# Patient Record
Sex: FEMALE | Race: WHITE
[De-identification: names, ages, dates, MRNs, and addresses within clinical notes are randomized per-mention and may not be internally consistent; named-entity substitution may affect disease eponyms.]

---

## 2018-08-09 ENCOUNTER — HOSPITAL ENCOUNTER (EMERGENCY)
Dept: HOSPITAL 74 - JER | Age: 28
Discharge: HOME | End: 2018-08-09
Payer: COMMERCIAL

## 2018-08-09 VITALS — SYSTOLIC BLOOD PRESSURE: 109 MMHG | HEART RATE: 89 BPM | TEMPERATURE: 98.1 F | DIASTOLIC BLOOD PRESSURE: 69 MMHG

## 2018-08-09 VITALS — BODY MASS INDEX: 27.8 KG/M2

## 2018-08-09 DIAGNOSIS — M54.40: ICD-10-CM

## 2018-08-09 DIAGNOSIS — O26.892: Primary | ICD-10-CM

## 2018-08-09 DIAGNOSIS — Z3A.26: ICD-10-CM

## 2018-08-09 LAB
ALBUMIN SERPL-MCNC: 2.8 G/DL (ref 3.4–5)
ALP SERPL-CCNC: 85 U/L (ref 45–117)
ALT SERPL-CCNC: 35 U/L (ref 12–78)
ANION GAP SERPL CALC-SCNC: 11 MMOL/L (ref 8–16)
APPEARANCE UR: CLEAR
AST SERPL-CCNC: 20 U/L (ref 15–37)
BASOPHILS # BLD: 0.2 % (ref 0–2)
BILIRUB SERPL-MCNC: 0.2 MG/DL (ref 0.2–1)
BILIRUB UR STRIP.AUTO-MCNC: NEGATIVE MG/DL
BUN SERPL-MCNC: 8 MG/DL (ref 7–18)
CALCIUM SERPL-MCNC: 8.3 MG/DL (ref 8.5–10.1)
CHLORIDE SERPL-SCNC: 109 MMOL/L (ref 98–107)
CO2 SERPL-SCNC: 22 MMOL/L (ref 21–32)
COLOR UR: (no result)
CREAT SERPL-MCNC: 0.5 MG/DL (ref 0.55–1.02)
DEPRECATED RDW RBC AUTO: 14 % (ref 11.6–15.6)
EOSINOPHIL # BLD: 0.3 % (ref 0–4.5)
GLUCOSE SERPL-MCNC: 133 MG/DL (ref 74–106)
HCT VFR BLD CALC: 34.1 % (ref 32.4–45.2)
HGB BLD-MCNC: 11.9 GM/DL (ref 10.7–15.3)
INR BLD: 1.04 (ref 0.83–1.09)
KETONES UR QL STRIP: NEGATIVE
LEUKOCYTE ESTERASE UR QL STRIP.AUTO: NEGATIVE
LYMPHOCYTES # BLD: 20.2 % (ref 8–40)
MAGNESIUM SERPL-MCNC: 1.8 MG/DL (ref 1.8–2.4)
MCH RBC QN AUTO: 32.7 PG (ref 25.7–33.7)
MCHC RBC AUTO-ENTMCNC: 34.8 G/DL (ref 32–36)
MCV RBC: 94.1 FL (ref 80–96)
MONOCYTES # BLD AUTO: 5.3 % (ref 3.8–10.2)
NEUTROPHILS # BLD: 74 % (ref 42.8–82.8)
NITRITE UR QL STRIP: NEGATIVE
PH UR: 6 [PH] (ref 5–8)
PLATELET # BLD AUTO: 235 K/MM3 (ref 134–434)
PMV BLD: 8.4 FL (ref 7.5–11.1)
POTASSIUM SERPLBLD-SCNC: 3.5 MMOL/L (ref 3.5–5.1)
PROT SERPL-MCNC: 6.4 G/DL (ref 6.4–8.2)
PROT UR QL STRIP: NEGATIVE
PROT UR QL STRIP: NEGATIVE
PT PNL PPP: 11.7 SEC (ref 9.7–13)
RBC # BLD AUTO: 3.62 M/MM3 (ref 3.6–5.2)
SODIUM SERPL-SCNC: 142 MMOL/L (ref 136–145)
SP GR UR: 1.01 (ref 1–1.03)
UROBILINOGEN UR STRIP-MCNC: NEGATIVE MG/DL (ref 0.2–1)
WBC # BLD AUTO: 9.3 K/MM3 (ref 4–10)

## 2018-08-09 PROCEDURE — 3E033GC INTRODUCTION OF OTHER THERAPEUTIC SUBSTANCE INTO PERIPHERAL VEIN, PERCUTANEOUS APPROACH: ICD-10-PCS

## 2018-08-09 PROCEDURE — 3E0337Z INTRODUCTION OF ELECTROLYTIC AND WATER BALANCE SUBSTANCE INTO PERIPHERAL VEIN, PERCUTANEOUS APPROACH: ICD-10-PCS

## 2018-08-09 PROCEDURE — 3E0F7GC INTRODUCTION OF OTHER THERAPEUTIC SUBSTANCE INTO RESPIRATORY TRACT, VIA NATURAL OR ARTIFICIAL OPENING: ICD-10-PCS

## 2018-08-09 PROCEDURE — 3E033NZ INTRODUCTION OF ANALGESICS, HYPNOTICS, SEDATIVES INTO PERIPHERAL VEIN, PERCUTANEOUS APPROACH: ICD-10-PCS

## 2018-08-09 NOTE — PDOC
Attending Attestation





- HPI


HPI: 





18 20:40


The patient is a 26 weeks pregnant 28 year old female A1, with a 

significant PMH of C4-C6 disk herniation, sciatica, who presents to the 

emergency department complaining of 1 week of legs shaking while standing. The 

patient states she has had multiple episode of legs shaking while standing with 

associated headaches and palpitations. The patient reports she has visited her 

OB GYN for the same complaint of leg shaking who sent her to a vascular doctor 

who performed imaging with negative results. She denies any recent falls, 

injuries or trauma. She denies any recent vaginal bleeding. She denies any 

abdominal pain or cramping. 





The patient denies chest pain, shortness of breath, and dizziness.


Denies fever, chills, nausea, vomit, diarrhea and constipation.


Denies dysuria, frequency, urgency and hematuria.





Allergies: NKA











- Physicial Exam


PE: 





18 20:42


GENERAL: Awake, alert, and fully oriented, in no acute distress


HEAD: No signs of trauma


EYES: PERRLA, EOMI, sclera anicteric, conjunctiva clear


ENT: Auricles normal inspection, hearing grossly normal, nares patent, 

oropharynx clear without exudates. Moist mucosa


NECK: Normal ROM, supple, no lymphadenopathy, JVD, or masses


LUNGS: Breath sounds equal, clear to auscultation bilaterally.  No wheezes, and 

no crackles


HEART: Regular rate and rhythm, normal S1 and S2, no murmurs, rubs or gallops


ABDOMEN: Soft, nontender, normoactive bowel sounds.  No guarding, no rebound.  

No masses


EXTREMITIES: +Paraspinal tenderness. Normal range of motion, no edema.  No 

clubbing or cyanosis. No cords, erythema, or tenderness


NEUROLOGICAL: Cranial nerves II through XII grossly intact.  Normal speech, 

normal gait


SKIN: Warm, Dry, normal turgor, no rashes or lesions noted.








<Panda Quiroz - Last Filed: 18 20:47>





- Resident


Resident Name: Kat Hare





- ED Attending Attestation


I have performed the following: I have examined & evaluated the patient, The 

case was reviewed & discussed with the resident, I agree w/resident's findings 

& plan, Exceptions are as noted





- Medical Decision Making





18 19:06








I, Dr. Hilda Mckeon, DO, attest that this document has been prepared under 

my direction and personally reviewed by me in its entirety.   I further attest, 

that it accurately reflects all work, treatment, procedures and medical decision

-making performed by me.  


18 20:29


a/p: 27yo  at 26 weeks with back pain and paresthesias


-story consistent with sciatica - paraspinal ttp with buttock ttp and 

paresthsias down legs


-no LE weakness, no sensory changes


-also with ha


-given ha - will check pre-eclampsia labs


-will give tylenol and reglan for ha


-will send ua


-no loss of fluid or vag bleeding


-pt is delivering in the [place]


-pt has had prenatal care


-hx of herniated discs in back


-suspect pregnancy body changes are causing sciatica


-discussed sleeping with pillows appropriately placed under belly and between 

her legs


-discussed epsom salt bathes


18 21:52


pt states feeling better


ha improved


case discussed with L&D


po challenge given


will send to L&D for fetal monitoring





<Hilda Mckeon - Last Filed: 18 21:57>





**Discharge Disposition





- Discharge Dispostion


Decision to Admit order: No





<Hilda Mckeon - Last Filed: 18 21:57>





- Diagnosis


 Sciatica, Low back pain








- Discharge Dispostion


Disposition: HOME


Condition at time of disposition: Stable





- Referrals


Referrals: 


Timo Chao MD [Staff Physician] - 





- Patient Instructions


Printed Discharge Instructions:  DI for Sciatica


Additional Instructions: 


You were seen in the ED for complaints of leg shakiness and headaches.





In the ED you were evaluated with labwork and treated symptomatically with pain 

relief. 


Your lab values were unremarkable.


There does not appear to be a need for immediate hospitalization. 





You are advised to follow up with your primary care physician and gynecologist 

in 1 week.


Return to the ED immediately if you experience chest pain, worsening headaches, 

weakness, loss of consciousness, nausea, vomiting, fevers.





Please use epsom salt bathes. Please place a pillow between your legs when 

sleeping and under your abdomen when laying on your side.


-----------------------


Fuiste vista en el departamento de emergencias por quejas de temblores en las 

piernas y yesika de matt.





En el servicio de urgencias fue evaluado con anlisis de laboratorio y tratado 

sintomticamente con alivio del dolor.


Letha valores de laboratorio no fueron notables.


No parece nancy dee necesidad de hospitalizacin inmediata.





Se le recomienda hacer un seguimiento con alejandro mdico de atencin primaria y alejandro 

gineclogo en 1 semana.


Regrese al servicio de urgencias inmediatamente si experimenta dolor en el pecho

, empeoramiento de los yesika de matt, debilidad, prdida del conocimiento, 

nuseas, vmitos, fiebres





- Post Discharge Activity





Attestations





- Attestations





18 20:47





Documentation prepared by Panda Quiroz, acting as medical scribe for Hilda Mckeon DO.





<Panda Quiroz - Last Filed: 18 20:47>

## 2018-08-09 NOTE — PDOC
History of Present Illness





- General


Chief Complaint: Palpitations


Stated Complaint: HEART PALPITATIONS


Time Seen by Provider: 18 17:34





- History of Present Illness


Initial Comments: 





18 18:23


28 year old A1 with a history of C4-C6 disk herniation currently 26 weeks 

pregnant (LNMP 2/10/18)  who presents with 1 week of shaking legs while 

standing w/ numbness in the arms and hands now with 1 episode of a nose bleed 

that has since resolved. She notes that when she stands she feels the shaking 

in the legs, headaches, heart palpitations and slight dizziness as if the room 

is spinning. She reports some swelling in the ankles for which she saw her PCP 

and was referred to vascular who did not find significant findings. She denies 

shortness of breath, abdominal pain, diarrhea, constipation, vaginal bleeding, 

pain with urination and blood in urine or stool. She has no other complaints at 

bedside.








PMHX: as in HPI


Meds: Tylenol BID 


Allergies: none


Tob: none


Etoh: none


Rec drugs: none














Past History





- Past Medical History


Allergies/Adverse Reactions: 


 Allergies











Allergy/AdvReac Type Severity Reaction Status Date / Time


 


No Known Allergies Allergy   Verified 18 18:04











COPD: No





- Suicide/Smoking/Psychosocial Hx


Smoking History: Never smoked


Have you smoked in the past 12 months: No


Information on smoking cessation initiated: No


Hx Alcohol Use: No


Drug/Substance Use Hx: No





**Review of Systems





- Review of Systems


Able to Perform ROS?: Yes


Is the patient limited English proficient: No


Constitutional: No: Chills





*Physical Exam





- Vital Signs


 Last Vital Signs











Temp Pulse Resp BP Pulse Ox


 


 98.1 F   89   22   109/69   100 


 


 18 17:59  18 17:59  18 17:59  18 17:59  18 17:59














- Physical Exam


Comments: 





18 18:54


GENERAL: Awake, alert, and fully oriented, in no acute distress


HEAD: No signs of trauma, normocephalic, atraumatic 


EYES: EOMI, sclera anicteric, conjunctiva clear


ENT:  oropharynx clear without exudates. Moist mucosa


NECK: Normal ROM, no c-spine tenderness


LUNGS: No distress, speaks full sentences, + mild expiratory wheeze


HEART: Regular rate and rhythm, normal S1 and S2, no murmurs, rubs or gallops, 

peripheral pulses normal and equal bilaterally. 


ABDOMEN: Soft, nontender, normoactive bowel sounds.  No guarding, no rebound.  

No masses


EXTREMITIES : Normal inspection, Normal range of motion, no edema.  No clubbing 

or cyanosis. 


NEUROLOGICAL: Normal speech, normal gait, no focal sensorimotor deficits 


SKIN: Warm, Dry, normal turgor, no rashes or lesions noted














ED Treatment Course





- LABORATORY


CBC & Chemistry Diagram: 


 18 18:50





 18 20:33





Medical Decision Making





- Medical Decision Making





18 18:59


28 year old A1 with a history of C4-C6 disk herniation currently 26 weeks 

pregnant (LNMP 2/10/18)  who presents with 1 week of shaking legs while 

standing w/ numbness in the arms and hands now with 1 episode of a nose bleed 

that has since resolved. The patient notes that she has had sciatica in the 

past and states that this pain feels similar. Possible etiologies include 

sciatica vs hyperthyroidism vs cardiac arrythmia vs pre-eclampsia. 





18 19:44


Per attending assessment the patient's exam is most consistent with sciatica. 


Patient was counseled about sciatica treatments. 





18 19:47


Labs and EKG returned unremarkable. 





Attending discussed case with L&D who advised the patient.


See attending notes for disposition discussion notes.





FHR on bedside ultrasound: 136





 number: [id number]











*DC/Admit/Observation/Transfer


Diagnosis at time of Disposition: 


 Sciatica, Low back pain








- Discharge Dispostion


Disposition: HOME


Condition at time of disposition: Stable


Decision to Admit order: No





- Referrals


Referrals: 


Timo Chao MD [Staff Physician] - 





- Patient Instructions


Printed Discharge Instructions:  DI for Sciatica


Additional Instructions: 


You were seen in the ED for complaints of leg shakiness and headaches.





In the ED you were evaluated with labwork and treated symptomatically with pain 

relief. 


Your lab values were unremarkable.


There does not appear to be a need for immediate hospitalization. 





You are advised to follow up with your primary care physician and gynecologist 

in 1 week.


Return to the ED immediately if you experience chest pain, worsening headaches, 

weakness, loss of consciousness, nausea, vomiting, fevers.





Please use epsom salt bathes. Please place a pillow between your legs when 

sleeping and under your abdomen when laying on your side.


-----------------------


Fuiste vista en el departamento de emergencias por quejas de temblores en las 

piernas y yesika de matt.





En el servicio de urgencias fue evaluado con anlisis de laboratorio y tratado 

sintomticamente con alivio del dolor.


Letha valores de laboratorio no fueron notables.


No parece nancy dee necesidad de hospitalizacin inmediata.





Se le recomienda hacer un seguimiento con alejandro mdico de atencin primaria y alejandro 

gineclogo en 1 semana.


Regrese al servicio de urgencias inmediatamente si experimenta dolor en el pecho

, empeoramiento de los yesika de matt, debilidad, prdida del conocimiento, 

nuseas, vmitos, fiebres





- Post Discharge Activity

## 2018-08-11 NOTE — EKG
Test Reason : 

Blood Pressure : ***/*** mmHG

Vent. Rate : 075 BPM     Atrial Rate : 075 BPM

   P-R Int : 146 ms          QRS Dur : 066 ms

    QT Int : 372 ms       P-R-T Axes : 054 062 033 degrees

   QTc Int : 415 ms

 

NORMAL SINUS RHYTHM

NORMAL ECG

NO PREVIOUS ECGS AVAILABLE

Confirmed by WALDEMAR SPANGLER MD ([id number]) on 8/11/2018 9:07:30 AM

 

Referred By:             Confirmed By:WALDEMAR SPANGLER MD